# Patient Record
Sex: FEMALE | Race: OTHER
[De-identification: names, ages, dates, MRNs, and addresses within clinical notes are randomized per-mention and may not be internally consistent; named-entity substitution may affect disease eponyms.]

---

## 2017-07-23 ENCOUNTER — HOSPITAL ENCOUNTER (EMERGENCY)
Dept: HOSPITAL 80 - FED | Age: 32
Discharge: HOME | End: 2017-07-23
Payer: COMMERCIAL

## 2017-07-23 VITALS
HEART RATE: 61 BPM | OXYGEN SATURATION: 97 % | SYSTOLIC BLOOD PRESSURE: 124 MMHG | RESPIRATION RATE: 16 BRPM | DIASTOLIC BLOOD PRESSURE: 80 MMHG

## 2017-07-23 VITALS — TEMPERATURE: 98.4 F

## 2017-07-23 DIAGNOSIS — R10.84: Primary | ICD-10-CM

## 2017-07-23 DIAGNOSIS — E86.9: ICD-10-CM

## 2017-07-23 LAB
% IMMATURE GRANULYOCYTES: 0.2 % (ref 0–1.1)
ABSOLUTE IMMATURE GRANULOCYTES: 0.01 10^3/UL (ref 0–0.1)
ABSOLUTE NRBC COUNT: 0 10^3/UL (ref 0–0.01)
ADD DIFF?: NO
ADD MORPH?: NO
ADD SCAN?: NO
ALBUMIN SERPL-MCNC: 3.7 G/DL (ref 3.5–5)
ALP SERPL-CCNC: 64 IU/L (ref 38–126)
ALT SERPL-CCNC: 28 IU/L (ref 9–52)
ANION GAP SERPL CALC-SCNC: 11 MEQ/L (ref 8–16)
AST SERPL-CCNC: 31 IU/L (ref 14–46)
ATYPICAL LYMPHOCYTE FLAG: 40 (ref 0–99)
BILIRUB SERPL-MCNC: 0.5 MG/DL (ref 0.1–1.4)
BILIRUBIN-CONJUGATED: 0.3 MG/DL (ref 0–0.5)
BILIRUBIN-UNCONJUGATED: 0.2 MG/DL (ref 0–1.1)
CALCIUM SERPL-MCNC: 8.9 MG/DL (ref 8.5–10.4)
CHLORIDE SERPL-SCNC: 110 MEQ/L (ref 97–110)
CO2 SERPL-SCNC: 20 MEQ/L (ref 22–31)
COLOR UR: YELLOW
CREAT SERPL-MCNC: 0.7 MG/DL (ref 0.6–1)
ERYTHROCYTE [DISTWIDTH] IN BLOOD BY AUTOMATED COUNT: 12.3 % (ref 11.5–15.2)
FRAGMENT RBC FLAG: 0 (ref 0–99)
GFR SERPL CREATININE-BSD FRML MDRD: > 60 ML/MIN/{1.73_M2}
GLUCOSE SERPL-MCNC: 94 MG/DL (ref 70–100)
HCT VFR BLD CALC: 33.9 % (ref 38–47)
HGB BLD-MCNC: 11.7 G/DL (ref 12.6–16.3)
LEFT SHIFT FLG: 0 (ref 0–99)
LIPEMIA HEMOLYSIS FLAG: 90 (ref 0–99)
MCH RBC BLDCO QN: 31.1 PG (ref 27.9–34.1)
MCHC RBC AUTO-ENTMCNC: 34.5 G/DL (ref 32.4–36.7)
MCV RBC AUTO: 90.2 FL (ref 81.5–99.8)
NITRITE UR QL STRIP: NEGATIVE
NRBC-AUTO%: 0 % (ref 0–0.2)
PH UR STRIP: 7 [PH] (ref 5–7.5)
PLATELET # BLD: 192 10^3/UL (ref 150–400)
PLATELET CLUMPS FLAG: 10 (ref 0–99)
PMV BLD AUTO: 10.8 FL (ref 8.7–11.7)
POTASSIUM SERPL-SCNC: 3.8 MEQ/L (ref 3.5–5.2)
PROT SERPL-MCNC: 6.6 G/DL (ref 6.3–8.2)
RBC # BLD AUTO: 3.76 10^6/UL (ref 4.18–5.33)
SODIUM SERPL-SCNC: 141 MEQ/L (ref 134–144)
SP GR UR STRIP: 1.01 (ref 1–1.03)

## 2017-07-23 NOTE — EDPHY
H & P


Stated Complaint: LUQ PAIN AND HEADACHE TODAY


HPI/ROS: 





HPI





CHIEF COMPLAINT:  Multiple complaints, abdominal pain and headache





HISTORY OF PRESENT ILLNESS:  This patient very pleasant 32-year-old female, 

denies any significant medical history or surgical history she does not take 

any daily medications, she presents to the emergency room with multiple 

complaints.  She states for the past 24 hours she has had left upper quadrant 

abdominal pain with associated nausea but no vomiting.  Denies diarrhea.  Also 

complains of epigastric and right upper quadrant abdominal pain but worse on 

the left.  No fever.  Additionally she tells me she has bilateral ear pain, 

also tells me she has a global throbbing headache. No history of migraines.  No 

neck pain.  No meningeal signs. No fever.  She denies change in vision.  Denies 

numbness or tingling or focal weakness.  Denies chest pain or shortness of 

breath.  Main complaint is left upper quadrant abdominal pain.  Denies being 

pregnant. 





Past Medical History:   denies medical history





Past Surgical History:  Denies surgical history





Social History:  Denies daily use of drugs alcohol tobacco products.





Family History:  Noncontributory








ROS   


REVIEW OF SYSTEMS:


A comprehensive 10 point review of systems is otherwise negative aside from 

elements mentioned in the history of present illness.








Exam   


Constitutional   triage nursing summary reviewed, vital signs reviewed, awake/

alert. 


Eyes   normal conjunctivae and sclera, EOMI, PERRLA. 


HENT bilateral TMs normal,  normal inspection, atraumatic, moist mucus membranes

, no epistaxis, neck supple/ no meningismus, no raccoon eyes. 


Respiratory   clear to auscultation bilaterally, normal breath sounds, no 

respiratory distress, no wheezing. 


Cardiovascular   rate normal, regular rhythm, no murmur, no edema, distal 

pulses normal. 


Gastrointestinal   soft, mild tender palpation left upper quadrant epigastric 

region,, no rebound, no guarding, normal bowel sounds, no distension, no 

pulsatile mass. 


Genitourinary   no CVA tenderness. 


Musculoskeletal  no midline vertebral tenderness, full range of motion, no calf 

swelling, no tenderness of extremities, no meningismus, good pulses, 

neurovascularly intact.


Skin   pink, warm, & dry, no rash, skin atraumatic. 


Neurologic   awake, alert and oriented x 3, AAOx3, moves all 4 extremities 

equally, motor intact, sensory intact, CN II-XII intact, normal cerebellar, 

normal vision, normal speech. 


Psychiatric   normal mood/affect. 


Heme/Lymph/Immune   no lymphadenopathy.





Differential diagnosis includes but is not limited to and in no particular order

:  Bowel obstruction, appendicitis, gallbladder disease, diverticulitis, colitis

, enteritis, perforated viscus, gastritis, GERD, esophagitis, urinary tract 

infection, pyelonephritis, kidney stones





Medical Decision Making:  Plan for this patient IV establishment, IV fluid bolus

, IV Dilaudid for pain control IV Zofran for nausea, CT abdomen pelvis with IV 

contrast to help delineate acute abdominal pain, pregnancy test, urinalysis, re-

evaluate shortly.





Re-evaluation:








CT scan of the abdomen pelvis with IV contrast The results of the study are 

negative for acute inflammatory process, normal appendix.  The study was read 

by Dr. Alejandra I viewed the images myself on the PACS system.





2308:  Re-evaluation at this time patient resting comfortably.  CT scan does 

not show acute inflammatory process.  There is free fluid in the pelvis.  

Possible recently ruptured ovarian cyst.  However clinically on exam she does 

not have pelvic pain or lower abdominal pain or pain is located left upper 

quadrant.  No chest pain or shortness of breath. Her blood work CT are 

reassuring.  Urinalysis reassuring.  I will allow her to go home she does feel 

better after IV fluids IV Dilaudid and Zofran.  I have given her return 

precautions she understands return emergency room she develops any worsening 

abdominal pain, fever, vomiting.


Source: Patient





- Personal History


LMP (Females 10-55): 8-14 Days Ago


Current Tetanus/Diphtheria Vaccine: Yes


Current Tetanus Diphtheria and Acellular Pertussis (TDAP): Yes





- Medical/Surgical History


Hx Asthma: No


Hx Chronic Respiratory Disease: No


Hx Diabetes: No


Hx Cardiac Disease: No


Hx Renal Disease: No


Hx Cirrhosis: No


Hx Alcoholism: No


Hx HIV/AIDS: No


Hx Splenectomy or Spleen Trauma: No


Other PMH: Denies





- Social History


Smoking Status: Never smoked


Constitutional: 


 Initial Vital Signs











Temperature (C)  36.9 C   07/23/17 21:41


 


Heart Rate  71   07/23/17 21:41


 


Respiratory Rate  18   07/23/17 21:41


 


Blood Pressure  144/89 H  07/23/17 21:41


 


O2 Sat (%)  99   07/23/17 21:41








 











O2 Delivery Mode               Room Air














Allergies/Adverse Reactions: 


 





No Known Allergies Allergy (Unverified 07/23/17 21:44)


 








Home Medications: 














 Medication  Instructions  Recorded


 


NK [No Known Home Meds]  06/04/15














Medical Decision Making





- Diagnostics


Imaging Results: 


 Imaging Impressions





Abdomen CT  07/23/17 22:03


Impression:  


1. Motion limited study with no definite acute findings.


2. Small volume of fluid in the pelvis, which could related to cyst rupture.


3. 4 cm structure at the left margin of the uterus most likely representing a 

fibroid. 


4. Additional findings as above.


 


Findings discussed with Campbell Ocampo MD 7/23/2017 at 23:00. 














- Data Points


Laboratory Results: 


 Laboratory Results





 07/23/17 22:20 





 07/23/17 22:20 





 











  07/23/17 07/23/17 07/23/17





  22:30 22:20 22:20


 


WBC      





    


 


RBC      





    


 


Hgb      





    


 


Hct      





    


 


MCV      





    


 


MCH      





    


 


MCHC      





    


 


RDW      





    


 


Plt Count      





    


 


MPV      





    


 


Neut % (Auto)      





    


 


Lymph % (Auto)      





    


 


Mono % (Auto)      





    


 


Eos % (Auto)      





    


 


Baso % (Auto)      





    


 


Nucleat RBC Rel Count      





    


 


Absolute Neuts (auto)      





    


 


Absolute Lymphs (auto)      





    


 


Absolute Monos (auto)      





    


 


Absolute Eos (auto)      





    


 


Absolute Basos (auto)      





    


 


Absolute Nucleated RBC      





    


 


Immature Gran %      





    


 


Immature Gran #      





    


 


Sodium      141 mEq/L mEq/L





     (134-144) 


 


Potassium      3.8 mEq/L mEq/L





     (3.5-5.2) 


 


Chloride      110 mEq/L mEq/L





     () 


 


Carbon Dioxide      20 mEq/l L mEq/l





     (22-31) 


 


Anion Gap      11 mEq/L mEq/L





     (8-16) 


 


BUN      12 mg/dL mg/dL





     (7-23) 


 


Creatinine      0.7 mg/dL mg/dL





     (0.6-1.0) 


 


Estimated GFR      > 60 





    


 


Glucose      94 mg/dL mg/dL





     () 


 


Calcium      8.9 mg/dL mg/dL





     (8.5-10.4) 


 


Total Bilirubin      0.5 mg/dL mg/dL





     (0.1-1.4) 


 


Conjugated Bilirubin      0.3 mg/dL mg/dL





     (0.0-0.5) 


 


Unconjugated Bilirubin      0.2 mg/dL mg/dL





     (0.0-1.1) 


 


AST      31 IU/L IU/L





     (14-46) 


 


ALT      28 IU/L IU/L





     (9-52) 


 


Alkaline Phosphatase      64 IU/L IU/L





     () 


 


Total Protein      6.6 g/dL g/dL





     (6.3-8.2) 


 


Albumin      3.7 g/dL g/dL





     (3.5-5.0) 


 


Lipase      82.0 IU/L IU/L





     () 


 


Beta HCG, Qual    NEGATIVE   





    


 


Urine Color  YELLOW     





    


 


Urine Appearance  CLEAR     





    


 


Urine pH  7.0     





   (5.0-7.5)   


 


Ur Specific Gravity  1.014     





   (1.002-1.030)   


 


Urine Protein  NEGATIVE     





   (NEGATIVE)   


 


Urine Ketones  NEGATIVE     





   (NEGATIVE)   


 


Urine Blood  NEGATIVE     





   (NEGATIVE)   


 


Urine Nitrate  NEGATIVE     





   (NEGATIVE)   


 


Urine Bilirubin  NEGATIVE     





   (NEGATIVE)   


 


Urine Urobilinogen  NEGATIVE EU EU    





   (0.2-1.0)   


 


Ur Leukocyte Esterase  NEGATIVE     





   (NEGATIVE)   


 


Urine Glucose  NEGATIVE     





   (NEGATIVE)   














  07/23/17





  22:20


 


WBC  5.73 10^3/uL 10^3/uL





   (3.80-9.50) 


 


RBC  3.76 10^6/uL L 10^6/uL





   (4.18-5.33) 


 


Hgb  11.7 g/dL L g/dL





   (12.6-16.3) 


 


Hct  33.9 % L %





   (38.0-47.0) 


 


MCV  90.2 fL fL





   (81.5-99.8) 


 


MCH  31.1 pg pg





   (27.9-34.1) 


 


MCHC  34.5 g/dL g/dL





   (32.4-36.7) 


 


RDW  12.3 % %





   (11.5-15.2) 


 


Plt Count  192 10^3/uL 10^3/uL





   (150-400) 


 


MPV  10.8 fL fL





   (8.7-11.7) 


 


Neut % (Auto)  52.2 % %





   (39.3-74.2) 


 


Lymph % (Auto)  38.2 % %





   (15.0-45.0) 


 


Mono % (Auto)  6.8 % %





   (4.5-13.0) 


 


Eos % (Auto)  2.1 % %





   (0.6-7.6) 


 


Baso % (Auto)  0.5 % %





   (0.3-1.7) 


 


Nucleat RBC Rel Count  0.0 % %





   (0.0-0.2) 


 


Absolute Neuts (auto)  2.99 10^3/uL 10^3/uL





   (1.70-6.50) 


 


Absolute Lymphs (auto)  2.19 10^3/uL 10^3/uL





   (1.00-3.00) 


 


Absolute Monos (auto)  0.39 10^3/uL 10^3/uL





   (0.30-0.80) 


 


Absolute Eos (auto)  0.12 10^3/uL 10^3/uL





   (0.03-0.40) 


 


Absolute Basos (auto)  0.03 10^3/uL 10^3/uL





   (0.02-0.10) 


 


Absolute Nucleated RBC  0.00 10^3/uL 10^3/uL





   (0-0.01) 


 


Immature Gran %  0.2 % %





   (0.0-1.1) 


 


Immature Gran #  0.01 10^3/uL 10^3/uL





   (0.00-0.10) 


 


Sodium  





  


 


Potassium  





  


 


Chloride  





  


 


Carbon Dioxide  





  


 


Anion Gap  





  


 


BUN  





  


 


Creatinine  





  


 


Estimated GFR  





  


 


Glucose  





  


 


Calcium  





  


 


Total Bilirubin  





  


 


Conjugated Bilirubin  





  


 


Unconjugated Bilirubin  





  


 


AST  





  


 


ALT  





  


 


Alkaline Phosphatase  





  


 


Total Protein  





  


 


Albumin  





  


 


Lipase  





  


 


Beta HCG, Qual  





  


 


Urine Color  





  


 


Urine Appearance  





  


 


Urine pH  





  


 


Ur Specific Gravity  





  


 


Urine Protein  





  


 


Urine Ketones  





  


 


Urine Blood  





  


 


Urine Nitrate  





  


 


Urine Bilirubin  





  


 


Urine Urobilinogen  





  


 


Ur Leukocyte Esterase  





  


 


Urine Glucose  





  











Medications Given: 


 








Discontinued Medications





Hydromorphone HCl (Dilaudid)  0.5 mg IVP EDNOW ONE


   Stop: 07/23/17 22:03


   Last Admin: 07/23/17 22:18 Dose:  0.5 mg


Sodium Chloride (Ns)  1,000 mls @ 0 mls/hr IV EDNOW ONE; Wide Open


   PRN Reason: Protocol


   Stop: 07/23/17 22:03


   Last Admin: 07/23/17 22:16 Dose:  1,000 mls


Ondansetron HCl (Zofran)  4 mg IVP EDNOW ONE


   Stop: 07/23/17 22:03


   Last Admin: 07/23/17 22:17 Dose:  4 mg








Departure





- Departure


Disposition: Home, Routine, Self-Care


Clinical Impression: 


Abdominal pain


Qualifiers:


 Abdominal location: generalized Qualified Code(s): R10.84 - Generalized 

abdominal pain





Condition: Good


Instructions:  Acute Abdominal Pain (ED)


Additional Instructions: 


1. Return emergency room if develops worsening abdominal pain, fever, vomiting.


2. [place] diet for the next 12:48 p.m. forty eight hours.


Referrals: 


[facility] [Other] - As per Instructions

## 2018-03-08 ENCOUNTER — HOSPITAL ENCOUNTER (EMERGENCY)
Dept: HOSPITAL 80 - FED | Age: 33
LOS: 1 days | Discharge: HOME | End: 2018-03-09
Payer: COMMERCIAL

## 2018-03-08 VITALS — RESPIRATION RATE: 16 BRPM | TEMPERATURE: 98.2 F

## 2018-03-08 DIAGNOSIS — O99.89: Primary | ICD-10-CM

## 2018-03-08 DIAGNOSIS — M79.605: ICD-10-CM

## 2018-03-08 DIAGNOSIS — Z3A.12: ICD-10-CM

## 2018-03-08 NOTE — EDPHY
H & P


Stated Complaint: R/O L LEG DVT/3 MS PREGNANT SENT BY CLINIC


Time Seen by Provider: 03/08/18 22:05


HPI/ROS: 





HPI





CHIEF COMPLAINT:  Left leg pain x1 month.





HISTORY OF PRESENT ILLNESS:  This patient very pleasant 32-year-old female, she 

is currently 12 weeks pregnant, she presents emergency room with left leg pain.

  She is concerned about a blood clot.  She did make an appoint with her 

primary care doctor her they referred her to the emergency room for evaluation 

of this.  Denies chest pain or shortness of breath.  She does state she is 12 

weeks pregnant.  Denies any abdominal pain.  Pain is located in the back of her 

left leg diffusely.





Past Medical History:  Denies significant medical history





Past Surgical History:  Denies significant surgical history





Social History:  Denies drugs alcohol tobacco products.





Family History:  Noncontributory








ROS   


REVIEW OF SYSTEMS:


A comprehensive 10 point review of systems is otherwise negative aside from 

elements mentioned in the history of present illness.








Exam   


Constitutional appears well nontoxic no acute distress   triage nursing summary 

reviewed, vital signs reviewed, awake/alert. 


Eyes   normal conjunctivae and sclera, EOMI, PERRLA. 


HENT   normal inspection, atraumatic, moist mucus membranes, no epistaxis, neck 

supple/ no meningismus, no raccoon eyes. 


Respiratory   clear to auscultation bilaterally, normal breath sounds, no 

respiratory distress, no wheezing. 


Cardiovascular   rate normal, regular rhythm, no murmur, no edema, distal 

pulses normal. 


Gastrointestinal   soft, non-tender, no rebound, no guarding, normal bowel 

sounds, no distension, no pulsatile mass. 


Genitourinary   no CVA tenderness. 


Musculoskeletal  left lower extremity:  Good distal pulse.  Neurovascularly 

intact.  Good cap refill.  No significant swelling on exam.  No tenderness on 

exam.  no midline vertebral tenderness, full range of motion, no calf swelling, 

no tenderness of extremities, no meningismus, good pulses, neurovascularly 

intact.


Skin   pink, warm, & dry, no rash, skin atraumatic. 


Neurologic   awake, alert and oriented x 3, AAOx3, moves all 4 extremities 

equally, motor intact, sensory intact, CN II-XII intact, normal cerebellar, 

normal vision, normal speech. 


Psychiatric   normal mood/affect. 


Heme/Lymph/Immune   no lymphadenopathy.





Differential Diagnosis:  Includes but is not limited to in a particular order 

DVT, musculoskeletal strain, Baker cyst





Medical Decision Making:  Plan for this patient given that she is pregnant 

having left posterior leg pain will proceed with ultrasound to rule out DVT.





Re-evaluation:








Ultrasound of the left lower extremity shows no evidence of DVT.  This called 

to me by Dr. Witt.





Updated patient about ultrasound results.  Recommend taking Tylenol for pain 

control.  Return emergency room if there is worsening pain swelling fever 

questions or concerns.  Additionally follow up with her primary care doctor.


Source: Patient





- Personal History


LMP (Females 10-55): Pregnant


Current Tetanus Diphtheria and Acellular Pertussis (TDAP): Yes





- Medical/Surgical History


Hx Asthma: No


Hx Chronic Respiratory Disease: No


Hx Diabetes: No


Hx Cardiac Disease: No


Hx Renal Disease: No


Hx Cirrhosis: No


Hx Alcoholism: No


Hx HIV/AIDS: No


Hx Splenectomy or Spleen Trauma: No


Other PMH: Denies





- Social History


Smoking Status: Never smoked


Constitutional: 


 Initial Vital Signs











Temperature (C)  36.8 C   03/08/18 22:02


 


Heart Rate  66   03/08/18 22:02


 


Respiratory Rate  16   03/08/18 22:02


 


Blood Pressure  137/81 H  03/08/18 22:02


 


O2 Sat (%)  99   03/08/18 22:02








 











O2 Delivery Mode               Room Air














Allergies/Adverse Reactions: 


 





No Known Allergies Allergy (Verified 03/08/18 22:01)


 








Home Medications: 














 Medication  Instructions  Recorded


 


NK [No Known Home Meds]  06/04/15














Departure





- Departure


Disposition: Home, Routine, Self-Care


Clinical Impression: 


Leg pain


Qualifiers:


 Laterality: left Qualified Code(s): M79.605 - Pain in left leg





Condition: Good


Instructions:  Leg Pain (ED)


Additional Instructions: 


1. Return emergency room if you have worsening pain questions or concerns.


2. Your ultrasound did not show any evidence of blood clot.


Referrals: 


Sarina Carlton PA [Primary Care Provider] - As per Instructions


Print Language: Dutch

## 2018-03-09 VITALS — OXYGEN SATURATION: 97 % | HEART RATE: 74 BPM | DIASTOLIC BLOOD PRESSURE: 65 MMHG | SYSTOLIC BLOOD PRESSURE: 120 MMHG
